# Patient Record
(demographics unavailable — no encounter records)

---

## 2025-01-03 NOTE — HISTORY OF PRESENT ILLNESS
[FreeTextEntry1] : Here for CPE. Overall feeling well. Sometimes has mild pain radiate from right neck to right shoulder. [de-identified] : Never a smoker. Rare alcohol. Last mammogram May 2023. Last GYN check-up early 2023. Exercises 2-3 times per week

## 2025-01-03 NOTE — PHYSICAL EXAM
[No Acute Distress] : no acute distress [Well Nourished] : well nourished [Well Developed] : well developed [Well-Appearing] : well-appearing [Normal Sclera/Conjunctiva] : normal sclera/conjunctiva [PERRL] : pupils equal round and reactive to light [EOMI] : extraocular movements intact [Normal Outer Ear/Nose] : the outer ears and nose were normal in appearance [Normal Oropharynx] : the oropharynx was normal [No JVD] : no jugular venous distention [No Lymphadenopathy] : no lymphadenopathy [Supple] : supple [Thyroid Normal, No Nodules] : the thyroid was normal and there were no nodules present [No Respiratory Distress] : no respiratory distress  [No Accessory Muscle Use] : no accessory muscle use [Clear to Auscultation] : lungs were clear to auscultation bilaterally [Normal Rate] : normal rate  [Regular Rhythm] : with a regular rhythm [Normal S1, S2] : normal S1 and S2 [No Murmur] : no murmur heard [No Carotid Bruits] : no carotid bruits [No Abdominal Bruit] : a ~M bruit was not heard ~T in the abdomen [No Varicosities] : no varicosities [Pedal Pulses Present] : the pedal pulses are present [No Edema] : there was no peripheral edema [No Palpable Aorta] : no palpable aorta [No Extremity Clubbing/Cyanosis] : no extremity clubbing/cyanosis [Soft] : abdomen soft [Non Tender] : non-tender [Non-distended] : non-distended [No Masses] : no abdominal mass palpated [No HSM] : no HSM [Normal Bowel Sounds] : normal bowel sounds [Normal Posterior Cervical Nodes] : no posterior cervical lymphadenopathy [Normal Anterior Cervical Nodes] : no anterior cervical lymphadenopathy [No CVA Tenderness] : no CVA  tenderness [No Spinal Tenderness] : no spinal tenderness [No Joint Swelling] : no joint swelling [Grossly Normal Strength/Tone] : grossly normal strength/tone [No Rash] : no rash [Coordination Grossly Intact] : coordination grossly intact [No Focal Deficits] : no focal deficits [Normal Gait] : normal gait [Deep Tendon Reflexes (DTR)] : deep tendon reflexes were 2+ and symmetric [Normal Affect] : the affect was normal [Normal Insight/Judgement] : insight and judgment were intact [de-identified] : tenderness to palpation right anterior shoulder

## 2025-01-03 NOTE — PLAN
[FreeTextEntry1] : Check labs Recommend GYN check-up and mammogram see Ortho regarding shoulder pain

## 2025-04-17 NOTE — DISCUSSION/SUMMARY
[FreeTextEntry1] : I reviewed the above findings with the patient with visual illustrations. Treatment options for the prolapse were discussed and included doing nothing, Kegel exercises and behavioral modification, a pessary, or surgical correction. Surgically we discussed a posterior colporrhaphy.  We reviewed the surgical procedure and postoperative restrictions.  At this point in time she is asymptomatic with the rectocele and does not desire treatment and will manage it conservatively.  She will follow-up here on an as-needed basis.  All questions were answered.  [facility] patient information on pelvic organ prolapse with posterior prolapse circled was given to her.

## 2025-04-17 NOTE — HISTORY OF PRESENT ILLNESS
[Rectal Prolapse] : no [Unable To Restrain Bowel Movement] : no [Urinary Frequency] : no [Feelings Of Urinary Urgency] : no [Urinary Frequency More Than Twice At Night (Nocturia)] : no nocturia [Constipation Obstructed Defecation] : no [FreeTextEntry4] : + menses [de-identified] : + sexually active - no issues

## 2025-04-17 NOTE — DISCUSSION/SUMMARY
[FreeTextEntry1] : I reviewed the above findings with the patient with visual illustrations. Treatment options for the prolapse were discussed and included doing nothing, Kegel exercises and behavioral modification, a pessary, or surgical correction. Surgically we discussed a posterior colporrhaphy.  We reviewed the surgical procedure and postoperative restrictions.  At this point in time she is asymptomatic with the rectocele and does not desire treatment and will manage it conservatively.  She will follow-up here on an as-needed basis.  All questions were answered.  [facility] patient information on pelvic organ prolapse with posterior prolapse circled was given to her.

## 2025-04-17 NOTE — PHYSICAL EXAM
[Well developed] : well developed [Well Nourished] : ~L well nourished [Good Hygeine] : demonstrates good hygeine [Normal Mood/Affect] : mood and affect are normal [Normal Appearance] : general appearance was normal [Normal] : normal [Uterine Adnexae] : were not tender and not enlarged [Normal rectal exam] : was normal [Anxiety] : patient is not anxious [Tenderness] : ~T no ~M abdominal tenderness observed [Distended] : not distended